# Patient Record
Sex: FEMALE | Race: WHITE
[De-identification: names, ages, dates, MRNs, and addresses within clinical notes are randomized per-mention and may not be internally consistent; named-entity substitution may affect disease eponyms.]

---

## 2018-08-29 ENCOUNTER — HOSPITAL ENCOUNTER (OUTPATIENT)
Dept: HOSPITAL 47 - RADMAMWWP | Age: 73
Discharge: HOME | End: 2018-08-29
Attending: INTERNAL MEDICINE
Payer: MEDICARE

## 2018-08-29 DIAGNOSIS — Z85.3: ICD-10-CM

## 2018-08-29 DIAGNOSIS — Z08: Primary | ICD-10-CM

## 2018-08-29 PROCEDURE — 77066 DX MAMMO INCL CAD BI: CPT

## 2018-08-29 PROCEDURE — 77062 BREAST TOMOSYNTHESIS BI: CPT

## 2018-08-29 NOTE — MM
Reason for exam: additional evaluation requested from prior study.

Last mammogram was performed 1 year ago.



History:

Patient is postmenopausal and has history of breast cancer at age 63.

Family history of breast cancer in paternal aunt at age 60, breast cancer in 

paternal aunt, and breast cancer in 2 maternal aunts.

Malignant right breast needle localzation of the right breast, September 18, 2009.

Lumpectomy of the right breast, September 18, 2009.  Malignant right mammotome 

panel of the right breast, August 31, 2009.  Benign excisional biopsy of the left 

breast, 1990.  Benign excisional biopsy of the right breast, 1980.  Benign 

excisional biopsy of the right breast, 1970.  Benign excisional biopsy of the left

breast, 1969.

Took estrogen for 9 years 10 months beginning at age 52.  Took progesterone for 9 

years 10 months beginning at age 52.  Took antineoplastic for 5 years beginning at

age 63.



Physical Findings:

Nurse did not find any significant physical abnormalities on exam.



MG 3D Diag Mammo W/Cad ODALYS

Bilateral CC and MLO view(s) were taken.

Prior study comparison: August 21, 2017, bilateral MG 3d diag mammo w/cad ODALYS.  

August 18, 2016, bilateral MG 3d diag mammo w/cad ODALYS.

The breast tissue is heterogeneously dense. This may lower the sensitivity of 

mammography.

Finding: There are typically benign coarse calcifications in the right breast. 

Focal asymmetry. Post lumpectomy changes.

No significant changes in finding since August 21, 2017 and August 18, 2016.



These results were verbally communicated with the patient and result sheet given 

to the patient on 8/29/18.





ASSESSMENT: Benign, BI-RAD 2



RECOMMENDATION:

Routine screening mammogram of both breasts in 1 year.

## 2019-08-30 ENCOUNTER — HOSPITAL ENCOUNTER (OUTPATIENT)
Dept: HOSPITAL 47 - RADMAMWWP | Age: 74
End: 2019-08-30
Attending: INTERNAL MEDICINE
Payer: MEDICARE

## 2019-08-30 DIAGNOSIS — Z85.3: ICD-10-CM

## 2019-08-30 DIAGNOSIS — Z08: Primary | ICD-10-CM

## 2019-08-30 PROCEDURE — 77062 BREAST TOMOSYNTHESIS BI: CPT

## 2019-08-30 PROCEDURE — 77066 DX MAMMO INCL CAD BI: CPT

## 2019-08-30 NOTE — MM
Reason for exam: additional evaluation requested from prior study.

Last mammogram was performed 1 year ago.



History:

Patient is postmenopausal and has history of breast cancer at age 63.

Family history of breast cancer in paternal aunt at age 60, breast cancer in 

paternal aunt, and breast cancer in 2 maternal aunts.

Malignant right breast needle localzation of the right breast, September 18, 2009.

Lumpectomy of the right breast, September 18, 2009.  Malignant right mammotome 

panel of the right breast, August 31, 2009.  Benign excisional biopsy of the left 

breast, 1990.  Benign excisional biopsy of the right breast, 1980.  Benign 

excisional biopsy of the right breast, 1970.  Benign excisional biopsy of the left

breast, 1969.

Took estrogen for 9 years 10 months beginning at age 52.  Took progesterone for 9 

years 10 months beginning at age 52.  Took antineoplastic for 5 years beginning at

age 63.



Physical Findings:

Nurse did not find any significant physical abnormalities on exam.



MG 3D Diag Mammo W/Cad ODALYS

Bilateral CC and MLO view(s) were taken.  ML and spot compression CC view(s) were 

taken of the right breast.

Prior study comparison: August 29, 2018, bilateral MG 3d diag mammo w/cad ODALYS.  

August 21, 2017, bilateral MG 3d diag mammo w/cad ODALYS.

There are scattered fibroglandular densities.  Post surgical and post therapy 

change right breast. Progressive fat necrosis calcifications at the upper outer 

quadrant posterior scar. Adjacent nodularity on the CC view is superficial. Tech 

places a mole marker to indicate some nodularity relating the scar and this seems 

to correspond to the mammographic finding. 6 month follow up recommended.



These results were verbally communicated with the patient and result sheet given 

to the patient on 8/30/19.





ASSESSMENT: Probably benign, BI-RAD 3



RECOMMENDATION:

Follow-up diagnostic mammogram of the right breast in 6 months.

## 2019-12-06 ENCOUNTER — HOSPITAL ENCOUNTER (OUTPATIENT)
Dept: HOSPITAL 47 - WWCWWP | Age: 74
End: 2019-12-06
Attending: SURGERY
Payer: MEDICARE

## 2019-12-06 VITALS
TEMPERATURE: 97.8 F | DIASTOLIC BLOOD PRESSURE: 88 MMHG | HEART RATE: 75 BPM | SYSTOLIC BLOOD PRESSURE: 160 MMHG | RESPIRATION RATE: 18 BRPM

## 2019-12-06 DIAGNOSIS — L76.34: Primary | ICD-10-CM

## 2019-12-06 PROCEDURE — 87077 CULTURE AEROBIC IDENTIFY: CPT

## 2019-12-06 PROCEDURE — 87070 CULTURE OTHR SPECIMN AEROBIC: CPT

## 2019-12-06 PROCEDURE — 87186 SC STD MICRODIL/AGAR DIL: CPT

## 2019-12-06 PROCEDURE — 87075 CULTR BACTERIA EXCEPT BLOOD: CPT

## 2019-12-06 PROCEDURE — 87205 SMEAR GRAM STAIN: CPT

## 2019-12-06 NOTE — P.GSHP
History of Present Illness


H&P Date: 19


Chief Complaint: abnormal mammogram, nonhealing wound right breast








   The patient is a 74-year-old white female seen in consultation for Dr. Pete Buckley regarding a nonhealing wound of her right breast.  The patient is a 74 

year old white female who had a mammogram on 19.  She had a history of 

breast cancer at the age of 63 in the right breast, that was treated with a 

lumpectomy and radiation.  She had no involvement of the lymph nodes.  She did 

not have chemotherapy.  She did take Femara for about 5 years.  She had side 

effects from this with her feet and did not complete the full five-year course. 

The lumpectomy was in the upper inner aspect of the right breast.  In  the 

patient had a benign breast biopsy in the lateral aspect of the right breast.





Mammogram findings from 2019 revealed progressive fat necrosis and 

calcifications at the upper quadrant posterior scar area.  Adjacent nodularity 

in the cc view was superficial.  A marker was placed there to indicate some 

nodularity related in the scar and this seemed to correspond to the mammographic

findings.  Six-month follow-up of the right breast was recommended.  No lesions 

of concern were noted in the left breast.





Historical of significance is the fact that the marker would have been placed on

the upper outer quadrant area of the right breast.  The patient several days 

later noticed that the marker was still in place and when she removed as there 

was some excoriation at the area.  An abscess developed at this site which 

required drainage in her primary care doctor's office on several occasions.  The

area continues to drain and she comes for evaluation related to this.  The area 

of the skin is slightly pink in color.  The drainage is clear.  She has not been

treated with any antibiotics.  The patient is not having any pain.





Family History:


father: bladder cancer


mother: colon cancer


brother: cancer unknown primary and 


brother: prostate





Hormonal History:


menarche: 15


, breast fed: no, age at birth: 24


menopause: 52


hormones: 11 years, BCP: 20 years





Medical History:


dry eyes


poor feeling in feet and pain


HTN


thyroid nodules/radioactive iodine, does not take synthroid








Surgical History:





1. thyroid resection


2. tubaligation


3. right knee replacement


4. six breast biopsies, bilateral


5. right breast lumpectomy for invasive lobular cancer < 1 cm








Social history:


smoke: none


alcohol: weekends


drugs: none

















- Constitutional


Constitutional: Denies chills, Denies fever





- EENT


Comment: 





dry eyes


Eyes: denies blurred vision, denies pain


Ears: deny: decreased hearing, tinnitus


Ears, nose, mouth and throat: Denies headache, Denies sore throat





- Breasts


Breasts: bilateral: as per HPI





- Cardiovascular


Cardiovascular: Reports high blood pressure





- Respiratory


Respiratory: Denies cough, Denies 7





- Gastrointestinal


Gastrointestinal: Denies abdominal pain, Denies diarrhea, Denies nausea, Denies 

vomiting





- Genitourinary (Female)


Genitourinary: Reports kidney stones, Denies dysuria, Denies hematuria





- Menstruation


Menstruation: Reports postmenopausal





- Musculoskeletal


Comment: 





arthritis





- Integumentary


Comment: 





dry skin, rosacea


Integumentary: Denies pruritus, Denies rash





- Neurological


Neurological: Denies numbness, Denies weakness





- Psychiatric


Psychiatric: Denies anxiety, Denies depression





- Endocrine


Endocrine: Denies fatigue, Denies weight change





- Hematologic/Lymphatic


Comment: 





none





- Allergic/Immunologic


Allergic/Immunologic: Reports seasonal allergies





Medications and Allergies


                                Home Medications











 Medication  Instructions  Recorded  Confirmed  Type


 


Ascorbic Acid [Vitamin C] 360 mg PO HS 19 History


 


Azelaic Acid 1 applic TOPICAL HS 19 History


 


Calcium Carb/Vitamin D3/Vit K1 1 each PO QAM 19 History





[Citracal Soft Chew]    


 


Cartilage/Collagen/Bor/Hyalur 1 each PO QAM 19 History





[Move Free Ultra Tablet]    


 


Cholecalciferol (Vitamin D3) 2,000 unit PO HS 19 History





[Vitamin D3]    


 


Krill/Omega-3/Dha/Epa/Lipids 1 each PO BID 19 History





[Krill Oil 350 mg Softgel]    


 


Multivit-Min/Iron/Folic/Lutein 1 each PO HS 19 History





[Centrum Silver Women Tablet]    


 


Olmesartan [Benicar] 20 mg PO HS 19 History








                                    Allergies











Allergy/AdvReac Type Severity Reaction Status Date / Time


 


Sulfa (Sulfonamide Allergy  Rash/Hives Unverified 19 08:03





Antibiotics)     














Surgical - Exam





BMI 36.1





- General


obese





- Eyes


normal ocular movement





- ENT


normal pinna, no hearing loss, no congestion





- Neck


no masses, trachea midline, no lymphadectomy





- Respiratory


normal respiratory effort, clear to auscultation





- Cardiovascular


Rhythm: regular


Heart Sounds: normal: S1, S2





- Abdomen


Abdomen: soft, non tender, no guarding, no rigid, no rebound





- Integumentary





Lateral aspect of the right breast a small open area of drainage approximately 3

 mm in size the drainage is clear and serous-appearing in nature





- Neurologic


no disoriented, no combative





- Musculoskeletal


normal gait, normal posture





- Psychiatric


oriented to time, oriented to person, oriented to place, speech is normal, m

[place] intact





Breast examination:


Bra 42C


ptosis grade 3


Right breast: Scar lateral aspect of the breast with an opening approximately 3 

mm in the portion of the scar.  There is serous drainage from this area and on 

palpation there is a nodular area approximately 3 x 2 cm in size adjacent to 

this, multiple positional examined the remainder of the breast reveals 

fibrocystic changes with no dominant masses or nodules of concern otherwise


Right axilla: No adenopathy of concern


Left breast: Multiple positional exam no dominant masses or nodules of concern, 

well-healed scar from prior biopsies


Left axilla: No adenopathy of concern





Results





Mammogram reviewed





Assessment and Plan


Assessment: 





Impression:


1.  Mass right breast lateral aspect


2.  Personal history of invasive lobular carcinoma right breast


3.  Mammographic abnormality


4.  draining lesion right breast lateral aspect, this is believed to be 

contiguous with the prior biopsy cavity which is in the radiated tissue


5.  Fibrocystic breast changes


6.  Family history of cancer


7.  Hypertension


8.  Nephritis


9.  Right breast mammogram 6 month follow-up in 2020, to follow here 

physically lesion of concern on the mammogram





Plan:


1.  Appointment and wound clinic for hyperbaric oxygen


2.   wound cultures obtained awaiting results


3.  Options of conservative management with no intervention versus surgical 

resection versus hyperbaric oxygen and then possible surgical resection if this 

fails to heal well discussed with the patient.  Dr. Jeffrey Enriquez from infectious 

disease was called in consultation was set up at the wound clinic for possible 

hyperbaric oxygen.  The patient understands the options and the risks and 

benefits and wishes to proceed with consultation with Dr. Jeffrey Enriquez.  She has 

vacation scheduled in [place] for January and will return to see us in April.





Additionally the patient was recommended to have repeat right breast mammogram 

in 6 months which will be in January prior to her trip to [place] if there is 

any concern on that radiograph would like to see her at that time.





CC: Dr. Buckley





Encounter 40 minutes, > 50% in counseling and planning

## 2020-01-07 ENCOUNTER — HOSPITAL ENCOUNTER (OUTPATIENT)
Dept: HOSPITAL 47 - RADMAMWWP | Age: 75
Discharge: HOME | End: 2020-01-07
Attending: INTERNAL MEDICINE
Payer: MEDICARE

## 2020-01-07 DIAGNOSIS — Z85.3: ICD-10-CM

## 2020-01-07 DIAGNOSIS — R92.8: Primary | ICD-10-CM

## 2020-01-07 PROCEDURE — 77061 BREAST TOMOSYNTHESIS UNI: CPT

## 2020-01-07 PROCEDURE — 77065 DX MAMMO INCL CAD UNI: CPT

## 2020-01-07 NOTE — MM
Reason for exam: follow-up at short interval from prior study.

Last mammogram was performed 4 months ago.



History:

Patient is postmenopausal and has history of breast cancer at age 63.

Family history of breast cancer in paternal aunt at age 60, breast cancer in 

paternal aunt, and breast cancer in 2 maternal aunts.

Malignant right breast needle localzation of the right breast, September 18, 2009.

Lumpectomy of the right breast, September 18, 2009.  Malignant right mammotome 

panel of the right breast, August 31, 2009.  Benign excisional biopsy of the left 

breast, 1990.  Benign excisional biopsy of the right breast, 1980.  Benign 

excisional biopsy of the right breast, 1970.  Benign excisional biopsy of the left

breast, 1969.

Took estrogen for 9 years 10 months beginning at age 52.  Took progesterone for 9 

years 10 months beginning at age 52.  Took antineoplastic for 5 years beginning at

age 63.



Physical Findings:

Nurse did not find any significant physical abnormalities on exam.



MG 3D Diag Mammo W/Cad RT

CC and MLO view(s) were taken of the right breast.

Prior study comparison: August 30, 2019, bilateral MG 3d diag mammo w/cad ODALYS.  

August 29, 2018, bilateral MG 3d diag mammo w/cad ODALYS.

The breast tissue is heterogeneously dense. This may lower the sensitivity of 

mammography.  No suspicious abnormality. Post therapy change on the right. 

Previous asymmetry appears as fat necrosis.



These results were verbally communicated with the patient and result sheet given 

to the patient on 1/7/20.





ASSESSMENT: Benign, BI-RAD 2



RECOMMENDATION:

Routine screening mammogram of both breasts in 8 months.

Back on schedule for August 2020.

## 2020-08-25 ENCOUNTER — HOSPITAL ENCOUNTER (OUTPATIENT)
Dept: HOSPITAL 47 - RADMAMWWP | Age: 75
Discharge: HOME | End: 2020-08-25
Attending: INTERNAL MEDICINE
Payer: MEDICARE

## 2020-08-25 DIAGNOSIS — Z12.31: Primary | ICD-10-CM

## 2020-08-25 PROCEDURE — 77063 BREAST TOMOSYNTHESIS BI: CPT

## 2020-08-25 PROCEDURE — 77067 SCR MAMMO BI INCL CAD: CPT

## 2020-08-26 NOTE — MM
Reason for exam: screening  (asymptomatic).

Last mammogram was performed 8 months ago.



History:

Patient is postmenopausal and has history of breast cancer at age 63.

Family history of breast cancer in paternal aunt at age 60, breast cancer in 

paternal aunt, and breast cancer in 2 maternal aunts.

Malignant right breast needle localzation of the right breast, September 18, 2009.

Lumpectomy of the right breast, September 18, 2009.  Malignant right mammotome 

panel of the right breast, August 31, 2009.  Benign excisional biopsy of the left 

breast, 1990.  Benign excisional biopsy of the right breast, 1980.  Benign 

excisional biopsy of the right breast, 1970.  Benign excisional biopsy of the left

breast, 1969.

Took estrogen for 9 years 10 months beginning at age 52.  Took progesterone for 9 

years 10 months beginning at age 52.  Took antineoplastic for 5 years beginning at

age 63.



Physical Findings:

A clinical breast exam by your physician is recommended on an annual basis and 

results should be correlated with mammographic findings.



MG 3D Screening Mammo W/Cad

Bilateral CC and MLO view(s) were taken.

Prior study comparison: January 7, 2020, right breast MG 3d diag mammo w/cad RT.  

August 30, 2019, bilateral MG 3d diag mammo w/cad ODALYS.

The breast tissue is heterogeneously dense. This may lower the sensitivity of 

mammography.  Stable benign calcifications. There is no discrete abnormality.  No 

significant changes when compared with prior studies.





ASSESSMENT: Benign, BI-RAD 2



RECOMMENDATION:

Routine screening mammogram of both breasts in 1 year.

## 2021-02-15 ENCOUNTER — HOSPITAL ENCOUNTER (OUTPATIENT)
Dept: HOSPITAL 47 - LABWHC1 | Age: 76
Discharge: HOME | End: 2021-02-15
Attending: INTERNAL MEDICINE
Payer: MEDICARE

## 2021-02-15 DIAGNOSIS — E55.9: ICD-10-CM

## 2021-02-15 DIAGNOSIS — E03.9: Primary | ICD-10-CM

## 2021-02-15 DIAGNOSIS — E78.5: ICD-10-CM

## 2021-02-15 DIAGNOSIS — I10: ICD-10-CM

## 2021-02-15 LAB
ALBUMIN SERPL-MCNC: 5 G/DL (ref 3.8–4.9)
ALBUMIN/GLOB SERPL: 2.08 G/DL (ref 1.6–3.17)
ALP SERPL-CCNC: 90 U/L (ref 41–126)
ALT SERPL-CCNC: 25 U/L (ref 8–44)
ANION GAP SERPL CALC-SCNC: 5.3 MMOL/L (ref 4–12)
AST SERPL-CCNC: 22 U/L (ref 13–35)
BASOPHILS # BLD AUTO: 0.04 X 10*3/UL (ref 0–0.1)
BASOPHILS NFR BLD AUTO: 0.7 %
BUN SERPL-SCNC: 24 MG/DL (ref 9–27)
BUN/CREAT SERPL: 26.67 RATIO (ref 12–20)
CALCIUM SPEC-MCNC: 9.8 MG/DL (ref 8.7–10.3)
CHLORIDE SERPL-SCNC: 105 MMOL/L (ref 96–109)
CHOLEST SERPL-MCNC: 275 MG/DL (ref 0–200)
CO2 SERPL-SCNC: 28.7 MMOL/L (ref 21.6–31.8)
EOSINOPHIL # BLD AUTO: 0.2 X 10*3/UL (ref 0.04–0.35)
EOSINOPHIL NFR BLD AUTO: 3.4 %
ERYTHROCYTE [DISTWIDTH] IN BLOOD BY AUTOMATED COUNT: 4.33 X 10*6/UL (ref 4.1–5.2)
ERYTHROCYTE [DISTWIDTH] IN BLOOD: 11.9 % (ref 11.5–14.5)
GLOBULIN SER CALC-MCNC: 2.4 G/DL (ref 1.6–3.3)
GLUCOSE SERPL-MCNC: 96 MG/DL (ref 70–110)
HCT VFR BLD AUTO: 42.4 % (ref 37.2–46.3)
HDLC SERPL-MCNC: 97 MG/DL (ref 40–60)
HGB BLD-MCNC: 13.8 G/DL (ref 12–15)
LDLC SERPL CALC-MCNC: 164.4 MG/DL (ref 0–131)
LYMPHOCYTES # SPEC AUTO: 1.99 X 10*3/UL (ref 0.9–5)
LYMPHOCYTES NFR SPEC AUTO: 34.2 %
MCH RBC QN AUTO: 31.9 PG (ref 27–32)
MCHC RBC AUTO-ENTMCNC: 32.5 G/DL (ref 32–37)
MCV RBC AUTO: 97.9 FL (ref 80–97)
MONOCYTES # BLD AUTO: 0.55 X 10*3/UL (ref 0.2–1)
MONOCYTES NFR BLD AUTO: 9.5 %
NEUTROPHILS # BLD AUTO: 3.03 X 10*3/UL (ref 1.8–7.7)
NEUTROPHILS NFR BLD AUTO: 52 %
PLATELET # BLD AUTO: 254 X 10*3/UL (ref 140–440)
POTASSIUM SERPL-SCNC: 4.9 MMOL/L (ref 3.5–5.5)
PROT SERPL-MCNC: 7.4 G/DL (ref 6.2–8.2)
SODIUM SERPL-SCNC: 139 MMOL/L (ref 135–145)
T4 FREE SERPL-MCNC: 1.4 NG/DL (ref 0.8–1.8)
TRIGL SERPL-MCNC: 68 MG/DL (ref 0–149)
VLDLC SERPL CALC-MCNC: 13.6 MG/DL (ref 5–40)
WBC # BLD AUTO: 5.82 X 10*3/UL (ref 4.5–10)

## 2021-02-15 PROCEDURE — 84443 ASSAY THYROID STIM HORMONE: CPT

## 2021-02-15 PROCEDURE — 82306 VITAMIN D 25 HYDROXY: CPT

## 2021-02-15 PROCEDURE — 85025 COMPLETE CBC W/AUTO DIFF WBC: CPT

## 2021-02-15 PROCEDURE — 80053 COMPREHEN METABOLIC PANEL: CPT

## 2021-02-15 PROCEDURE — 84481 FREE ASSAY (FT-3): CPT

## 2021-02-15 PROCEDURE — 80061 LIPID PANEL: CPT

## 2021-02-15 PROCEDURE — 36415 COLL VENOUS BLD VENIPUNCTURE: CPT

## 2021-02-15 PROCEDURE — 84439 ASSAY OF FREE THYROXINE: CPT

## 2021-08-26 ENCOUNTER — HOSPITAL ENCOUNTER (OUTPATIENT)
Dept: HOSPITAL 47 - RADMAMWWP | Age: 76
Discharge: HOME | End: 2021-08-26
Attending: INTERNAL MEDICINE
Payer: MEDICARE

## 2021-08-26 DIAGNOSIS — Z80.3: ICD-10-CM

## 2021-08-26 DIAGNOSIS — Z12.31: Primary | ICD-10-CM

## 2021-08-26 PROCEDURE — 77063 BREAST TOMOSYNTHESIS BI: CPT

## 2021-08-26 PROCEDURE — 77067 SCR MAMMO BI INCL CAD: CPT

## 2021-08-30 NOTE — MM
Reason for exam: screening  (asymptomatic).

Last mammogram was performed 1 year ago.



History:

Patient is postmenopausal and has history of breast cancer at age 63.

Family history of breast cancer in paternal aunt at age 60, breast cancer in 

paternal aunt, and breast cancer in 2 maternal aunts.

Malignant right breast needle localzation of the right breast, September 18, 2009.

Lumpectomy of the right breast, September 18, 2009.  Malignant right mammotome 

panel of the right breast, August 31, 2009.  Benign excisional biopsy of the left 

breast, 1990.  Benign excisional biopsy of the right breast, 1980.  Benign 

excisional biopsy of the right breast, 1970.  Benign excisional biopsy of the left

breast, 1969.

Took estrogen for 9 years 10 months beginning at age 52.  Took progesterone for 9 

years 10 months beginning at age 52.  Took antineoplastic for 5 years beginning at

age 63.



Physical Findings:

A clinical breast exam by your physician is recommended on an annual basis and 

results should be correlated with mammographic findings.



MG 3D Screening Mammo W/Cad

Bilateral CC and MLO view(s) were taken.

Prior study comparison: August 25, 2020, bilateral MG 3d screening mammo w/cad.  

January 7, 2020, right breast MG 3d diag mammo w/cad RT.

There are scattered fibroglandular densities.  Post surgical and post therapy 

change right breast. Stable fat necrosis calcifications at lumpectomy site.  No 

significant changes when compared with prior studies.





ASSESSMENT: Benign, BI-RAD 2



RECOMMENDATION:

Routine screening mammogram of both breasts in 1 year.

## 2022-04-12 NOTE — P.GSHP
History of Present Illness


H&P Date: 04/12/22





75 yo female with a large collection of renal stones left[>2cm] who has pain and

 nausea seonfary to the stone.  SHe comes for a left pcnl.  the risks , 

complications and alternatives were discussed.





- Constitutional


Constitutional: Denies chills, Denies fever





- EENT


Eyes: denies blurred vision, denies pain


Ears, nose, mouth and throat: Denies headache, Denies sore throat





- Cardiovascular


Cardiovascular: Denies chest pain, Denies shortness of breath





- Respiratory


Respiratory: Denies cough, Denies 7





- Gastrointestinal


Gastrointestinal: Denies abdominal pain, Denies diarrhea, Denies nausea, Denies 

vomiting





- Genitourinary (Female)


Genitourinary: Denies dysuria, Denies hematuria





- Genitourinary (Male)


Genitourinary: Denies dysuria, Denies hematuria





- Musculoskeletal


Musculoskeletal: Denies myalgias





- Integumentary


Integumentary: Denies pruritus, Denies rash





- Neurological


Neurological: Denies numbness, Denies weakness





- Psychiatric


Psychiatric: Denies anxiety, Denies depression





- Endocrine


Endocrine: Denies fatigue, Denies weight change





Past Medical History


Past Medical History: Cancer, Hyperlipidemia, Hypertension, Osteoarthritis (OA),

 Skin Disorder, Thyroid Disorder


Additional Past Medical History / Comment(s): Right breast cancer 2009; skin 

cancer. HTN after cancer tx, takes Rx prn. Environmental allergies. Hx thyroid 

nodules, hx radioactive iodine tx 2004. Occ GERD.  IBS. Rosacea.  Lt kidney 

stone


History of Any Multi-Drug Resistant Organisms: None Reported


Past Surgical History: Breast Surgery, Orthopedic Surgery, Tubal Ligation


Additional Past Surgical History / Comment(s): Rt knee replacement 2006; benign 

excisional biopsy lt breast 1969, 1990; benign excisional biopsy rt breast 1970,

 1980; right breast lumpectomy 09/2009; cataracts


Past Anesthesia/Blood Transfusion Reactions: No Reported Reaction, Family Hi

story of Problems w/ Anesthesia


Additional Past Anesthesia/Blood Transfusion Reaction / Comment(s): Mother had 

prob years ago.


Smoking Status: Never smoker





- Past Family History


  ** Father


Family Medical History: Cancer


Additional Family Medical History / Comment(s): Bladder Cancer





  ** Mother


Family Medical History: Cancer


Additional Family Medical History / Comment(s): Colon Cancer





  ** Brother(s)


Family Medical History: Cancer


Additional Family Medical History / Comment(s): Brother #1: cancer of unknown 

origin.  Brother #2: prostate cancer





Medications and Allergies


                                Home Medications











 Medication  Instructions  Recorded  Confirmed  Type


 


Ascorbic Acid [Vitamin C] 360 mg PO HS 12/06/19 04/11/22 History


 


Azelaic Acid 1 applic TOPICAL HS PRN 12/06/19 04/11/22 History


 


Calcium Carb/Vitamin D3/Vit K1 1 each PO QAM 12/06/19 04/11/22 History





[Citracal Soft Chew]    


 


Cartilage/Collagen/Bor/Hyalur 1 each PO QAM 12/06/19 04/11/22 History





[Move Free Ultra Tablet]    


 


Cholecalciferol (Vitamin D3) 2,000 unit PO HS 12/06/19 04/11/22 History





[Vitamin D3]    


 


Krill/Omega-3/Dha/Epa/Lipids 1 each PO BID 12/06/19 04/11/22 History





[Krill Oil 350 mg Softgel]    


 


Multivit-Min/Iron/Folic/Lutein 1 each PO HS 12/06/19 04/11/22 History





[Centrum Silver Women Tablet]    


 


Olmesartan [Benicar] 20 mg PO DAILY PRN 12/06/19 04/11/22 History


 


Acetaminophen [Tylenol 8 Hour] 1,300 mg PO AS DIRECTED PRN 04/11/22 04/11/22 

History


 


Calcium Carbonate [Tums] 1,000 mg PO QID PRN 04/11/22 04/11/22 History


 


Fluticasone Nasal Spray [Flonase 2 spray EA NOSTRIL DAILY PRN 04/11/22 04/11/22 

History





Nasal Spray]    


 


L.acidoph,Paracasei, B.lactis 1 each PO DAILY 04/11/22 04/11/22 History





[Probiotic]    


 


Loperamide [Imodium] 2 mg PO QID PRN 04/11/22 04/11/22 History


 


Lutein (Unknown Dose) 1 tab PO DAILY 04/11/22  History








                                    Allergies











Allergy/AdvReac Type Severity Reaction Status Date / Time


 


Sulfa (Sulfonamide Allergy  Rash/Hives Unverified 04/11/22 16:10





Antibiotics)     














Surgical - Exam





- General


well developed, well nourished





- Eyes


normal ocular movement, no icteric





- ENT


no hearing loss, no congestion





- Neck


no masses, trachea midline





- Respiratory


normal respiratory effort, clear to auscultation





- Abdomen


Abdomen: soft, non tender, no guarding, no rigid, no rebound





- Integumentary


no rash, no abnormal pigmentation





- Neurologic


no disoriented, no combative





- Psychiatric


oriented to time, oriented to person, oriented to place, speech is normal, 

memory intact





Results





- Imaging


CT scan - abdomen: report reviewed, image reviewed


CT scan - pelvis: report reviewed, image reviewed





Assessment and Plan


Assessment: 





Impression: Left renal stones large


Plan: PCNL left

## 2022-04-13 ENCOUNTER — HOSPITAL ENCOUNTER (OUTPATIENT)
Dept: HOSPITAL 47 - OR | Age: 77
LOS: 1 days | Discharge: HOME | End: 2022-04-14
Attending: UROLOGY
Payer: MEDICARE

## 2022-04-13 VITALS — BODY MASS INDEX: 36 KG/M2

## 2022-04-13 DIAGNOSIS — K58.9: ICD-10-CM

## 2022-04-13 DIAGNOSIS — E04.1: ICD-10-CM

## 2022-04-13 DIAGNOSIS — Z79.899: ICD-10-CM

## 2022-04-13 DIAGNOSIS — E78.5: ICD-10-CM

## 2022-04-13 DIAGNOSIS — K21.9: ICD-10-CM

## 2022-04-13 DIAGNOSIS — E07.9: ICD-10-CM

## 2022-04-13 DIAGNOSIS — Z85.3: ICD-10-CM

## 2022-04-13 DIAGNOSIS — N20.0: Primary | ICD-10-CM

## 2022-04-13 DIAGNOSIS — Z88.2: ICD-10-CM

## 2022-04-13 DIAGNOSIS — I10: ICD-10-CM

## 2022-04-13 DIAGNOSIS — M19.90: ICD-10-CM

## 2022-04-13 PROCEDURE — 86850 RBC ANTIBODY SCREEN: CPT

## 2022-04-13 PROCEDURE — 50432 PLMT NEPHROSTOMY CATHETER: CPT

## 2022-04-13 PROCEDURE — 74018 RADEX ABDOMEN 1 VIEW: CPT

## 2022-04-13 PROCEDURE — 82365 CALCULUS SPECTROSCOPY: CPT

## 2022-04-13 PROCEDURE — 86901 BLOOD TYPING SEROLOGIC RH(D): CPT

## 2022-04-13 PROCEDURE — 86900 BLOOD TYPING SEROLOGIC ABO: CPT

## 2022-04-13 RX ADMIN — POTASSIUM CHLORIDE SCH: 14.9 INJECTION, SOLUTION INTRAVENOUS at 17:28

## 2022-04-13 RX ADMIN — ONDANSETRON ONE MG: 2 INJECTION INTRAMUSCULAR; INTRAVENOUS at 08:13

## 2022-04-13 RX ADMIN — DEXAMETHASONE SODIUM PHOSPHATE ONE MG: 4 INJECTION, SOLUTION INTRAMUSCULAR; INTRAVENOUS at 08:13

## 2022-04-13 RX ADMIN — POTASSIUM CHLORIDE SCH MLS: 14.9 INJECTION, SOLUTION INTRAVENOUS at 09:06

## 2022-04-13 RX ADMIN — ONDANSETRON ONE: 2 INJECTION INTRAMUSCULAR; INTRAVENOUS at 17:28

## 2022-04-13 RX ADMIN — DEXAMETHASONE SODIUM PHOSPHATE ONE: 4 INJECTION, SOLUTION INTRAMUSCULAR; INTRAVENOUS at 17:28

## 2022-04-13 RX ADMIN — HYDROMORPHONE HYDROCHLORIDE PRN MG: 1 INJECTION, SOLUTION INTRAMUSCULAR; INTRAVENOUS; SUBCUTANEOUS at 11:11

## 2022-04-13 RX ADMIN — POTASSIUM CHLORIDE SCH MLS: 14.9 INJECTION, SOLUTION INTRAVENOUS at 08:07

## 2022-04-13 RX ADMIN — KETOROLAC TROMETHAMINE PRN MG: 15 INJECTION, SOLUTION INTRAMUSCULAR; INTRAVENOUS at 11:25

## 2022-04-13 RX ADMIN — HYDROMORPHONE HYDROCHLORIDE PRN MG: 1 INJECTION, SOLUTION INTRAMUSCULAR; INTRAVENOUS; SUBCUTANEOUS at 10:57

## 2022-04-13 RX ADMIN — KETOROLAC TROMETHAMINE PRN MG: 15 INJECTION, SOLUTION INTRAMUSCULAR; INTRAVENOUS at 16:57

## 2022-04-13 RX ADMIN — KETOROLAC TROMETHAMINE PRN MG: 15 INJECTION, SOLUTION INTRAMUSCULAR; INTRAVENOUS at 23:53

## 2022-04-13 RX ADMIN — POTASSIUM CHLORIDE SCH MLS/HR: 14.9 INJECTION, SOLUTION INTRAVENOUS at 14:14

## 2022-04-13 RX ADMIN — ACETAMINOPHEN PRN MG: 325 TABLET, FILM COATED ORAL at 20:45

## 2022-04-13 NOTE — P.OP
Date of Procedure: 04/13/22


Preoperative Diagnosis: 


Left renal stones, large (greater than 2 cm)


Postoperative Diagnosis: 


Same


Procedure(s) Performed: 


Cystoscopy, placement of occluding balloon catheter left, percutaneous 

nephrostomy (Dr. saldivar), percutaneous nephrostolithotomy with ultrasound, 

placement of 10 J nephrostomy


Anesthesia: ALBERTO


Surgeon: Erwin Ramos


Estimated Blood Loss (ml): 100


Pathology: other (Stone)


Condition: stable


Disposition: PACU


Indications for Procedure: 


Patient is 76.  She has a painful volume, large, greater than 2 cm, kidney 

stones in the left renal pelvis.  She comes for percutaneous nephrostolithotomy


Description of Procedure: 


Patient brought to the operating suite.  Given general anesthesia on the 

transport gurney.  She's placed in a frog position.  Sterile prep and drape was 

administered.  Cystoscopy Foroblique lens and 21-[place] sheath identifies 

chronic cystitis cystica.  The left ureter orifice is identified and intubated 

with a 5-[place] occluding balloon catheter passed up the renal pelvis.  The 

cystoscope removed.  A 16-[place] Conley catheters introduced into the bladder and

secured to the ureteral catheter





The patient was placed in a prone position with care to airways and extremities.

 A sterile prep and drape was administered.  The left kidney is intubated by Dr. saldivar of radiology.  This is a middle pole calyx.  I then dilate the 

nephrostomy tract to 30-[place].  Introduced the rigid sheath into the collecting

system.  Arch renal stones are seen in the pelvis.  With ultrasound these are 

broken into smaller pieces and grasped and removed.  Then pass a flexible 

cystoscope down and the UPJ and basket stone fragments.  I then looked 

throughout the collecting system best possible and see no remaining stones.  

Then the procedure there is no remaining stones on fluoroscopy.  A 10 J 

nephrostomy tube was placed.  It is secured to the skin.  The patient's awake 

and returned recovery in good condition.  Blood loss is about 100 mL.  Her 

condition is good.

## 2022-04-13 NOTE — XR
EXAMINATION TYPE: XR KUB

 

DATE OF EXAM: 4/13/2022

 

COMPARISON: None

 

HISTORY: Renal stones presurgical evaluation

 

TECHNIQUE: AP supine abdomen

 

FINDINGS: There are 2 large calcifications in the left renal pelvis measuring 2.2-1.5 cm in size. Mul
tiple additional punctate calcifications are within the left kidney.

 

There is a large calcification within the left upper pelvis measuring 2.7 cm. Correlate for uterine f
ibroid.

 

Psoas margins are normal. Normal bowel gas is present. Organomegaly is not present. Osseous structure
s appear intact.

 

IMPRESSION:

1.  2 large calcifications in the left renal pelvic region with additional smaller calcifications lef
t kidney

## 2022-04-13 NOTE — FL
EXAMINATION TYPE: FL Perc Nephrostomy New Access

 

DATE OF EXAM: 4/13/2022

 

COMPARISON: NONE

 

HISTORY: Left renal stone  

 

Procedure had been discussed with the patient by Dr. Ramos,  risks, benefits, alternatives, were dis
cussed and any questions were answered.  Informed consent was obtained.  The patient was in a semipro
ne position prepped and draped on the OR table in the usual sterile fashion.  Utilizing a 15 cm lengt
h Chiba needle a single pass was made into a lower pole posterior calyx under fluoroscopic guidance. 
 An 0.018 guidewire is passed through the needle and there was placement of a 6-Mongolian catheter sheat
h system.  There was conversion to a  0.035 system was performed with passage of a guidewire into the
 ureter utilizing a directional catheter.  A second safety wire was placed.  Remaining portion of pro
cedure performed by .   Approximately 5 minutes and 20 of fluoroscopy was provided.  3 images
 provided.

 

IMPRESSION:

1. Successful intraoperative left nephrostomy prior to nephrolithotomy.

## 2022-04-14 VITALS
TEMPERATURE: 97.9 F | DIASTOLIC BLOOD PRESSURE: 64 MMHG | SYSTOLIC BLOOD PRESSURE: 152 MMHG | HEART RATE: 74 BPM | RESPIRATION RATE: 18 BRPM

## 2022-04-14 RX ADMIN — POTASSIUM CHLORIDE SCH: 14.9 INJECTION, SOLUTION INTRAVENOUS at 10:21

## 2022-04-14 RX ADMIN — KETOROLAC TROMETHAMINE PRN MG: 15 INJECTION, SOLUTION INTRAMUSCULAR; INTRAVENOUS at 06:32

## 2022-04-14 RX ADMIN — ACETAMINOPHEN PRN MG: 325 TABLET, FILM COATED ORAL at 02:58

## 2022-04-14 RX ADMIN — POTASSIUM CHLORIDE SCH MLS/HR: 14.9 INJECTION, SOLUTION INTRAVENOUS at 00:32

## 2022-04-14 RX ADMIN — ACETAMINOPHEN PRN MG: 325 TABLET, FILM COATED ORAL at 09:58

## 2022-04-14 NOTE — P.DS
Providers


Expected date of discharge: 04/14/22


Attending physician: 


Erwin Ramos





Primary care physician: 


Ruby Last MD





Hospital Course: 


On the day of admission, the patient underwent an uncomplicated left 

percutaneous nephrolithotomy.  She experienced pain the night of surgery, but 

the following morning she was feeling much better.  Her pain was controlled with

Tylenol and Toradol.  The nephrostomy tube was draining blood-tinged urine.  The

Conley catheter was draining clear urine.





Procedures: 


Left percutaneous nephrolithotomy on 04/13/2022.


Patient Condition at Discharge: Good





Plan - Discharge Summary


Discharge Rx Participant: Yes


New Discharge Prescriptions: 


New


   Ketorolac [Toradol] 10 mg PO Q6HR PRN #12 tab


     PRN Reason: Pain





No Action


   Ascorbic Acid [Vitamin C] 360 mg PO HS


   Multivit-Min/Iron/Folic/Lutein [Centrum Silver Women Tablet] 1 each PO HS


   Calcium Carb/Vitamin D3/Vit K1 [Citracal Soft Chew] 1 each PO QAM


   Krill/Omega-3/Dha/Epa/Lipids [Krill Oil 350 mg Softgel] 1 each PO BID


   Cholecalciferol (Vitamin D3) [Vitamin D3] 2,000 unit PO HS


   Olmesartan [Benicar] 20 mg PO DAILY PRN


     PRN Reason: elevated blood pressure


   Azelaic Acid 1 applic TOPICAL HS PRN


     PRN Reason: rosacea


   Cartilage/Collagen/Bor/Hyalur [Move Free Ultra Tablet] 1 each PO QAM


   Fluticasone Nasal Spray [Flonase Nasal Spray] 2 spray EA NOSTRIL DAILY PRN


     PRN Reason: allergy sx


   Acetaminophen [Tylenol 8 Hour] 1,300 mg PO AS DIRECTED PRN


     PRN Reason: Pain


   Loperamide [Imodium] 2 mg PO QID PRN


     PRN Reason: IBS sx


   L.acidoph,Paracasei, B.lactis [Probiotic] 1 each PO DAILY


   Lutein (Unknown Dose) 1 tab PO DAILY


   Calcium Carbonate [Tums] 1,000 mg PO QID PRN


     PRN Reason: GERD


Discharge Medication List





Ascorbic Acid [Vitamin C] 360 mg PO HS 12/06/19 [History]


Azelaic Acid 1 applic TOPICAL HS PRN 12/06/19 [History]


Calcium Carb/Vitamin D3/Vit K1 [Citracal Soft Chew] 1 each PO QAM 12/06/19 

[History]


Cartilage/Collagen/Bor/Hyalur [Move Free Ultra Tablet] 1 each PO QAM 12/06/19 

[History]


Cholecalciferol (Vitamin D3) [Vitamin D3] 2,000 unit PO HS 12/06/19 [History]


Krill/Omega-3/Dha/Epa/Lipids [Krill Oil 350 mg Softgel] 1 each PO BID 12/06/19 

[History]


Multivit-Min/Iron/Folic/Lutein [Centrum Silver Women Tablet] 1 each PO HS 

12/06/19 [History]


Olmesartan [Benicar] 20 mg PO DAILY PRN 12/06/19 [History]


Acetaminophen [Tylenol 8 Hour] 1,300 mg PO AS DIRECTED PRN 04/11/22 [History]


Calcium Carbonate [Tums] 1,000 mg PO QID PRN 04/11/22 [History]


Fluticasone Nasal Spray [Flonase Nasal Spray] 2 spray EA NOSTRIL DAILY PRN 

04/11/22 [History]


L.acidoph,Paracasei, B.lactis [Probiotic] 1 each PO DAILY 04/11/22 [History]


Loperamide [Imodium] 2 mg PO QID PRN 04/11/22 [History]


Lutein (Unknown Dose) 1 tab PO DAILY 04/11/22 [History]


Ketorolac [Toradol] 10 mg PO Q6HR PRN #12 tab 04/14/22 [Rx]








Follow up Appointment(s)/Referral(s): 


Erwin Ramos MD [STAFF PHYSICIAN] - 04/19/22


Activity/Diet/Wound Care/Special Instructions: 


Discharge home with nephrostomy tube.  Diet as tolerated.  Drink plenty of 

fluids.  No strenuous activity.

## 2022-04-26 ENCOUNTER — HOSPITAL ENCOUNTER (OUTPATIENT)
Dept: HOSPITAL 47 - LABWHC1 | Age: 77
Discharge: HOME | End: 2022-04-26
Attending: STUDENT IN AN ORGANIZED HEALTH CARE EDUCATION/TRAINING PROGRAM
Payer: MEDICARE

## 2022-04-26 DIAGNOSIS — E55.9: ICD-10-CM

## 2022-04-26 DIAGNOSIS — I10: Primary | ICD-10-CM

## 2022-04-26 DIAGNOSIS — E03.9: ICD-10-CM

## 2022-04-26 LAB
ALBUMIN SERPL-MCNC: 4.5 G/DL (ref 3.8–4.9)
ALBUMIN/GLOB SERPL: 1.41 G/DL (ref 1.6–3.17)
ALP SERPL-CCNC: 103 U/L (ref 41–126)
ALT SERPL-CCNC: 42 U/L (ref 8–44)
ANION GAP SERPL CALC-SCNC: 11.9 MMOL/L (ref 10–18)
AST SERPL-CCNC: 25 U/L (ref 13–35)
BUN SERPL-SCNC: 19.4 MG/DL (ref 9–27)
BUN/CREAT SERPL: 23.72 RATIO (ref 12–20)
CALCIUM SPEC-MCNC: 10.4 MG/DL (ref 8.7–10.3)
CHLORIDE SERPL-SCNC: 103 MMOL/L (ref 96–109)
CHOLEST SERPL-MCNC: 271 MG/DL (ref 0–200)
CO2 SERPL-SCNC: 23.3 MMOL/L (ref 20–27.5)
ERYTHROCYTE [DISTWIDTH] IN BLOOD BY AUTOMATED COUNT: 4.39 X 10*6/UL (ref 4.1–5.2)
ERYTHROCYTE [DISTWIDTH] IN BLOOD: 11.9 % (ref 11.5–14.5)
GLOBULIN SER CALC-MCNC: 3.2 G/DL (ref 1.6–3.3)
GLUCOSE SERPL-MCNC: 101 MG/DL (ref 70–110)
HCT VFR BLD AUTO: 42.7 % (ref 37.2–46.3)
HDLC SERPL-MCNC: 81.4 MG/DL (ref 40–60)
HGB BLD-MCNC: 13.7 G/DL (ref 12–15)
LDLC SERPL CALC-MCNC: 171.9 MG/DL (ref 0–131)
MCH RBC QN AUTO: 31.2 PG (ref 27–32)
MCHC RBC AUTO-ENTMCNC: 32.1 G/DL (ref 32–37)
MCV RBC AUTO: 97.3 FL (ref 80–97)
NRBC BLD AUTO-RTO: 0 /100 WBCS (ref 0–0)
PLATELET # BLD AUTO: 249 X 10*3/UL (ref 140–440)
POTASSIUM SERPL-SCNC: 4.6 MMOL/L (ref 3.5–5.5)
PROT SERPL-MCNC: 7.6 G/DL (ref 6.2–8.2)
SODIUM SERPL-SCNC: 138 MMOL/L (ref 135–145)
T4 FREE SERPL-MCNC: 1.6 NG/DL (ref 0.8–1.8)
TRIGL SERPL-MCNC: 88.7 MG/DL (ref 0–149)
VLDLC SERPL CALC-MCNC: 17.74 MG/DL (ref 5–40)
WBC # BLD AUTO: 5.64 X 10*3/UL (ref 4.5–10)

## 2022-04-26 PROCEDURE — 80061 LIPID PANEL: CPT

## 2022-04-26 PROCEDURE — 84443 ASSAY THYROID STIM HORMONE: CPT

## 2022-04-26 PROCEDURE — 80053 COMPREHEN METABOLIC PANEL: CPT

## 2022-04-26 PROCEDURE — 36415 COLL VENOUS BLD VENIPUNCTURE: CPT

## 2022-04-26 PROCEDURE — 82306 VITAMIN D 25 HYDROXY: CPT

## 2022-04-26 PROCEDURE — 84439 ASSAY OF FREE THYROXINE: CPT

## 2022-04-26 PROCEDURE — 85027 COMPLETE CBC AUTOMATED: CPT

## 2022-08-29 ENCOUNTER — HOSPITAL ENCOUNTER (OUTPATIENT)
Dept: HOSPITAL 47 - RADMAMWWP | Age: 77
Discharge: HOME | End: 2022-08-29
Attending: INTERNAL MEDICINE
Payer: MEDICARE

## 2022-08-29 DIAGNOSIS — Z12.31: Primary | ICD-10-CM

## 2022-08-29 PROCEDURE — 77063 BREAST TOMOSYNTHESIS BI: CPT

## 2022-08-29 PROCEDURE — 77067 SCR MAMMO BI INCL CAD: CPT

## 2022-08-29 NOTE — MM
Reason for Exam: Screening  (asymptomatic). 

Last screening mammogram was performed 12 month(s) ago.





Patient History: 

Menarche at age 15. First Full-Term Pregnancy at age 24. Postmenopausal. Breast cancer, right, age

63. Estrogen, starting at age 52 for 9 years, 10 months. Progesterone, starting at age 52 for 9

years, 10 months. 09/18/2009, Lumpectomy on the Right side. 1990, Benign Excisional Biopsy on the

left side. 1980, Benign Excisional Biopsy on the right side. 1970, Benign Excisional Biopsy on the

right side. 1969, Benign Excisional Biopsy on the left side. 9/18/2009, Malignant Excisional Biopsy

on the right side. 8/31/2009, Malignant Core Biopsy on the right side.

Paternal aunt had breast cancer, age 60. Paternal aunt had breast cancer. Maternal aunt had breast

cancer. Maternal aunt had breast cancer. 





Prior Study Comparison: 

8/21/2017 Bilateral Diagnostic Mammogram, [facility]. 8/29/2018 Bilateral Diagnostic Mammogram, [facility].

8/30/2019 Bilateral Diagnostic Mammogram, [facility]. 1/7/2020 Right Diagnostic Mammogram, [facility]. 8/25/2020

Bilateral Screening Mammogram, [facility]. 8/26/2021 Bilateral Screening Mammogram, [facility]. 





Tissue Density: 

There are scattered fibroglandular densities.





Findings: 

Analyzed By CAD. 

Redemonstration of right outer upper quadrant consultations with surgical clips.

There is no suspicious group of microcalcifications or new suspicious mass in either breast. 





Overall Assessment: Benign, BI-RAD 2





Management: 

Screening Mammogram of both breasts in 1 year.

A clinical breast exam by your physician is recommended on an annual basis and results should be

correlated with mammographic findings.



Electronically signed and approved by: Pelon Stovall DO

## 2023-01-19 ENCOUNTER — HOSPITAL ENCOUNTER (OUTPATIENT)
Dept: HOSPITAL 47 - RADXRMAIN | Age: 78
Discharge: HOME | End: 2023-01-19
Attending: UROLOGY
Payer: MEDICARE

## 2023-01-19 DIAGNOSIS — N20.0: Primary | ICD-10-CM

## 2023-01-19 DIAGNOSIS — N28.89: ICD-10-CM

## 2023-01-19 PROCEDURE — 74018 RADEX ABDOMEN 1 VIEW: CPT

## 2023-01-19 NOTE — XR
EXAMINATION TYPE: XR KUB

 

DATE OF EXAM: 1/19/2023

 

COMPARISON: 4/13/2022

 

INDICATION: Renal calculus

 

TECHNIQUE: Single view abdomen frontal projection

 

FINDINGS:  

There is a normal colonic bowel gas pattern. Some fecal debris is through the colon.

Psoas margins are normal.

No organomegaly is present.

Several calcifications are scattered through the left kidney. The largest inferior poler measures 1.2
 cm. There may be a calcified fibroid within the uterus within the left hemipelvis. Current findings 
appear stable from comparison. The prior large renal pelvic calcifications are not identified at this
 time.

 

IMPRESSION: 

1. Multiple residual calcifications to the left kidney.

2. Prior left renal pelvic calcifications not identified.

## 2023-08-30 ENCOUNTER — HOSPITAL ENCOUNTER (OUTPATIENT)
Dept: HOSPITAL 47 - RADMAMWWP | Age: 78
Discharge: HOME | End: 2023-08-30
Attending: INTERNAL MEDICINE
Payer: MEDICARE

## 2023-08-30 DIAGNOSIS — Z85.3: ICD-10-CM

## 2023-08-30 DIAGNOSIS — Z80.3: ICD-10-CM

## 2023-08-30 DIAGNOSIS — Z12.31: Primary | ICD-10-CM

## 2023-08-30 DIAGNOSIS — Z78.0: ICD-10-CM

## 2023-08-30 PROCEDURE — 77067 SCR MAMMO BI INCL CAD: CPT

## 2023-08-30 PROCEDURE — 77063 BREAST TOMOSYNTHESIS BI: CPT

## 2023-08-31 NOTE — MM
Reason for Exam: Hx of breast cancer, conservation therapy. 

Last screening mammogram was performed 12 month(s) ago.





Patient History: 

Menarche at age 15. First Full-Term Pregnancy at age 24. Postmenopausal. Breast cancer, right, age

63. Estrogen, starting at age 52 for 9 years, 10 months. Progesterone, starting at age 52 for 9

years, 10 months. 1990, Benign Excisional Biopsy on the left side. 1980, Benign Excisional Biopsy on

the right side. 1970, Benign Excisional Biopsy on the right side. 1969, Benign Excisional Biopsy on

the left side. 9/18/2009, Malignant Excisional Biopsy on the right side. 8/31/2009, Malignant Core

Biopsy on the right side.

Maternal aunt had breast cancer, age 60. Paternal aunt had breast cancer at or over age 50. Maternal

aunt had breast cancer at or over age 50. Maternal aunt had breast cancer. 





Prior Study Comparison: 

8/25/2020 Bilateral Screening Mammogram, [facility]. 8/26/2021 Bilateral Screening Mammogram, [facility].

8/29/2022 Bilateral MG 3D screening mammo w/cad, [facility]. 





Tissue Density: 

There are scattered fibroglandular densities.





Findings: 

Analyzed By CAD. 

Redemonstration of right outer upper quadrant benign calcifications are architectural distortion and

surgical clips.

There is no suspicious group of microcalcifications or new suspicious mass in either breast. 





Overall Assessment: Benign, BI-RAD 2





Management: 

Screening Mammogram of both breasts in 1 year.

A clinical breast exam by your physician is recommended on an annual basis and results should be

correlated with mammographic findings.



Electronically signed and approved by: Scott Corona D.O.

## 2024-09-03 ENCOUNTER — HOSPITAL ENCOUNTER (OUTPATIENT)
Dept: HOSPITAL 47 - RADMAMWWP | Age: 79
Discharge: HOME | End: 2024-09-03
Attending: FAMILY MEDICINE
Payer: MEDICARE

## 2024-09-03 PROCEDURE — 77067 SCR MAMMO BI INCL CAD: CPT

## 2024-09-03 PROCEDURE — 77063 BREAST TOMOSYNTHESIS BI: CPT

## 2024-09-08 NOTE — MM
Reason for Exam: Screening  (asymptomatic). 

Last mammogram was performed 1 year(s) and 1 month(s) ago. 





Patient History: 

Menarche at age 15. First Full-Term Pregnancy at age 24. Postmenopausal. Breast cancer, right, age

63. Estrogen, starting at age 52 for 9 years, 10 months. Progesterone, starting at age 52 for 9

years, 10 months. 09/18/2009, Lumpectomy on the Right side. 1990, Benign Excisional Biopsy on the

left side. 1980, Benign Excisional Biopsy on the right side. 1970, Benign Excisional Biopsy on the

right side. 1969, Benign Excisional Biopsy on the left side. 9/18/2009, Malignant Excisional Biopsy

on the right side. 8/31/2009, Malignant Core Biopsy on the right side.

Maternal aunt had breast cancer, age 60. Paternal aunt had breast cancer at or over age 50. Maternal

aunt had breast cancer at or over age 50. Maternal aunt had breast cancer. 





Prior Study Comparison: 

8/26/2021 Bilateral Screening Mammogram, [facility]. 8/29/2022 Bilateral MG 3D screening mammo w/cad, [facility].

8/30/2023 Bilateral MG 3D screening mammo w/cad, [facility]. 





Tissue Density: 

There are scattered areas of fibroglandular density.





Findings: 

Analyzed By CAD. 

Right breast surgical clips.



Right breast: There is no suspicious group of microcalcifications or new suspicious mass.

Benign-appearing calcifications right breast.



Left breast: There is no suspicious group of microcalcifications or new suspicious mass. 





Overall Assessment: Benign, BI-RAD 2





Management: 

Screening Mammogram of both breasts in 1 year.

[facility] will attempt to contact patient to return for supplemental views and

ultrasound if indicated.



Patient should continue monthly self-breast exams.  A clinical breast exam by your physician is

recommended on an annual basis.

This exam should not preclude additional follow-up of suspicious palpable abnormalities.



Note on Manjula scores and lifetime risk:

1. A Manjula score greater than 3% is considered moderate risk. If this is the case, consider

specialist referral to assess eligibility for a risk reducing agent.

2. If overall lifetime risk for the development of breast cancer is 20% or higher, the patient may

qualify for future screening with alternating mammogram and breast MRI.



Electronically signed and approved by: Pelon Stovall DO